# Patient Record
Sex: FEMALE | Race: WHITE | Employment: OTHER | URBAN - METROPOLITAN AREA
[De-identification: names, ages, dates, MRNs, and addresses within clinical notes are randomized per-mention and may not be internally consistent; named-entity substitution may affect disease eponyms.]

---

## 2018-06-26 ENCOUNTER — HOSPITAL ENCOUNTER (OUTPATIENT)
Dept: GENERAL RADIOLOGY | Age: 83
Discharge: HOME | End: 2018-06-26
Payer: MEDICARE

## 2018-06-26 DIAGNOSIS — R91.8 LUNG MASS: ICD-10-CM

## 2018-06-26 DIAGNOSIS — S22.41XA MULTIPLE FRACTURES OF RIBS OF RIGHT SIDE: ICD-10-CM

## 2018-06-26 PROCEDURE — 71100 X-RAY EXAM RIBS UNI 2 VIEWS: CPT

## 2018-06-26 PROCEDURE — 71046 X-RAY EXAM CHEST 2 VIEWS: CPT

## 2018-08-30 ENCOUNTER — OFFICE VISIT (OUTPATIENT)
Dept: UROLOGY | Age: 83
End: 2018-08-30

## 2018-08-30 VITALS
SYSTOLIC BLOOD PRESSURE: 151 MMHG | DIASTOLIC BLOOD PRESSURE: 75 MMHG | BODY MASS INDEX: 16.16 KG/M2 | OXYGEN SATURATION: 92 % | HEIGHT: 65 IN | HEART RATE: 98 BPM | WEIGHT: 97 LBS

## 2018-08-30 DIAGNOSIS — R35.0 URINARY FREQUENCY: Primary | ICD-10-CM

## 2018-08-30 DIAGNOSIS — N34.2 ATROPHIC URETHRITIS: ICD-10-CM

## 2018-08-30 DIAGNOSIS — N95.2 ATROPHIC VAGINITIS: ICD-10-CM

## 2018-08-30 LAB
BILIRUB UR QL STRIP: NEGATIVE
GLUCOSE UR-MCNC: NEGATIVE MG/DL
KETONES P FAST UR STRIP-MCNC: NEGATIVE MG/DL
PH UR STRIP: 5.5 [PH] (ref 4.6–8)
PROT UR QL STRIP: NEGATIVE
SP GR UR STRIP: 1.01 (ref 1–1.03)
UA UROBILINOGEN AMB POC: NORMAL (ref 0.2–1)
URINALYSIS CLARITY POC: CLEAR
URINALYSIS COLOR POC: YELLOW
URINE BLOOD POC: NEGATIVE
URINE LEUKOCYTES POC: NEGATIVE
URINE NITRITES POC: NEGATIVE

## 2018-08-30 RX ORDER — FLUOXETINE 10 MG/1
CAPSULE ORAL DAILY
COMMUNITY

## 2018-08-30 RX ORDER — CIPROFLOXACIN 500 MG/1
TABLET ORAL 2 TIMES DAILY
COMMUNITY

## 2018-08-30 NOTE — MR AVS SNAPSHOT
[address] [place] [address] 
[phone] Patient: Trudy Page MRN: [id number] VOP:9/31/5873 Visit Information Date & Time Provider Department Dept. Phone Encounter #  
 8/30/2018  1:30 PM Tony Elizabeth, [address] [place] [address] [facility] [phone] [id number] Upcoming Health Maintenance Date Due DTaP/Tdap/Td series (1 - Tdap) 9/19/1949 ZOSTER VACCINE AGE 60> 7/19/1988 GLAUCOMA SCREENING Q2Y 9/19/1993 Bone Densitometry (Dexa) Screening 9/19/1993 Pneumococcal 65+ Low/Medium Risk (1 of 2 - PCV13) 9/19/1993 MEDICARE YEARLY EXAM 3/20/2018 Influenza Age 5 to Adult 8/1/2018 Allergies as of 8/30/2018  Review Complete On: 8/30/2018 By: Art Rodriguez LPN No Known Allergies Current Immunizations  Never Reviewed No immunizations on file. Not reviewed this visit You Were Diagnosed With   
  
 Codes Comments Urinary frequency    -  Primary ICD-10-CM: R35.0 ICD-9-CM: 788.41 Vitals BP Pulse Height(growth percentile) Weight(growth percentile) SpO2 BMI  
 151/75 (BP 1 Location: Left arm, BP Patient Position: Sitting) 98 5' 5\" (1.651 m) 97 lb (44 kg) 92% 16.14 kg/m2 OB Status Smoking Status Postmenopausal Never Smoker Vitals History BMI and BSA Data Body Mass Index Body Surface Area  
 16.14 kg/m 2 1.42 m 2 Preferred Pharmacy Pharmacy Name [facility] - [place] De[place], [address] [place] [address],[phone] [place] [address] [phone] Your Updated Medication List  
  
   
This list is accurate as of 8/30/18  3:04 PM.  Always use your most recent med list.  
  
  
  
  
 acetaminophen 325 mg tablet Commonly known as:  TYLENOL Take 2 Tabs by mouth every four (4) hours as needed (for fever or pain level less than 5/10). ascorbic acid (vitamin C) 250 mg tablet Commonly known as:  VITAMIN C  
 Take 1 Tab by mouth two (2) times daily (with meals). betaxolol 0.25 % ophthalmic suspension Commonly known as:  BETOPTIC-S Administer 1 Drop to both eyes two (2) times a day. calcium carbonate 200 mg calcium (500 mg) Chew Commonly known as:  TUMS Take 1 Tab by mouth three (3) times daily (with meals). cholecalciferol 5,000 unit capsule Commonly known as:  VITAMIN D3 Take 1 Cap by mouth daily. CIPRO 500 mg tablet Generic drug:  ciprofloxacin HCl Take  by mouth two (2) times a day. cyanocobalamin (vitamin B-12) 2,000 mcg Tab Take 2,000 mcg by mouth daily. ferrous sulfate 325 mg (65 mg iron) tablet Take 1 Tab by mouth two (2) times daily (with meals). metoprolol tartrate 50 mg tablet Commonly known as:  LOPRESSOR Take 1 Tab by mouth every twelve (12) hours. PROzac 10 mg capsule Generic drug:  FLUoxetine Take  by mouth daily. traMADol 50 mg tablet Commonly known as:  ULTRAM  
Take 1 Tab by mouth every six (6) hours as needed (for pain level greater than 4/10). Max Daily Amount: 200 mg.  
  
 travoprost 0.004 % ophthalmic solution Commonly known as:  TRAVATAN Z Administer 1 Drop to right eye nightly. We Performed the Following AMB POC URINALYSIS DIP STICK AUTO W/O MICRO [18185 CPT(R)] Patient Instructions Frequent Urination: Care Instructions Your Care Instructions An urge to urinate frequently but usually passing only small amounts of urine is a common symptom of urinary problems, such as urinary tract infections. The bladder may become inflamed. This can cause the urge to urinate. You may try to urinate more often than usual to try to soothe that urge. Frequent urination also may be caused by sexually transmitted infections (STIs) or kidney stones.  Or it may happen when something irritates the tube that carries urine from the bladder to the outside of the body (urethra). It may also be a sign of diabetes. The cause may be hard to find. You may need tests. Follow-up care is a key part of your treatment and safety. Be sure to make and go to all appointments, and call your doctor if you are having problems. It's also a good idea to know your test results and keep a list of the medicines you take. How can you care for yourself at home? · Drink extra water for the next day or two. This will help make the urine less concentrated. (If you have kidney, heart, or liver disease and have to limit fluids, talk with your doctor before you increase the amount of fluids you drink.) · Avoid drinks that are carbonated or have caffeine. They can irritate the bladder. For women: · Urinate right after you have sex. · After you go to the bathroom, wipe from front to back. · Avoid douches, bubble baths, and feminine hygiene sprays. And avoid other feminine hygiene products that have deodorants. When should you call for help? Call your doctor now or seek immediate medical care if: 
  · You have new symptoms, such as fever, nausea, or vomiting.  
  · You have new or worse symptoms of a urinary problem. For example: ¨ You have blood or pus in your urine. ¨ You have chills or body aches. ¨ It hurts to urinate. ¨ You have groin or belly pain. ¨ You have pain in your back just below your rib cage (the flank area).  
 Watch closely for changes in your health, and be sure to contact your doctor if you feel thirstier than usual. 
Where can you learn more? Go to http://jessie-royal.info/. Enter 231 4357 in the search box to learn more about \"Frequent Urination: Care Instructions. \" Current as of: May 12, 2017 Content Version: 11.7 © 9042-2375 Healthwise, Incorporated. Care instructions adapted under license by [employer] (which disclaims liability or warranty for this information).  If you have questions about a medical condition or this instruction, always ask your healthcare professional. [address] any warranty or liability for your use of this information. Introducing \[facility] [place] [facility]\"" & HEALTH SERVICES! Kiera Guerrero introduces [employer] patient portal. Now you can access parts of your medical record, email your doctor's office, and request medication refills online. 1. In your internet browser, go to [email or web address] 2. Click on the First Time User? Click Here link in the Sign In box. You will see the New Member Sign Up page. 3. Enter your [employer] Access Code exactly as it appears below. You will not need to use this code after youve completed the sign-up process. If you do not sign up before the expiration date, you must request a new code. · [employer] Access Code: [email or web address] Expires: 9/24/2018  9:49 AM 
 
4. Enter the last four digits of your Social Security Number (xxxx) and Date of Birth (mm/dd/yyyy) as indicated and click Submit. You will be taken to the next sign-up page. 5. Create a [employer] ID. This will be your [email or web address] login ID and cannot be changed, so think of one that is secure and easy to remember. 6. Create a [email or web address] password. You can change your password at any time. 7. Enter your Password Reset Question and Answer. This can be used at a later time if you forget your password. 8. Enter your e-mail address. You will receive e-mail notification when new information is available in [address] 9. Click Sign Up. You can now view and download portions of your medical record. 10. Click the Download Summary menu link to download a portable copy of your medical information. If you have questions, please visit the Frequently Asked Questions section of the [email or web address] website. Remember, [employer] is NOT to be used for urgent needs. For medical emergencies, dial 911. Now available from your iPhone and Android! Please provide this summary of care documentation to your next provider. Your primary care clinician is listed as FELICITA ALONSO. If you have any questions after today's visit, please call [phone].

## 2018-08-30 NOTE — PROGRESS NOTES
Chief Complaint Patient presents with  New Patient  Urinary Frequency HISTORY OF PRESENT ILLNESS:  Kusum Age is a 80 y.o. female presents with significant nocturia. According to her, she may urinate almost hourly at night but only go a couple of times during the day. Her  who is with her states that sometimes she will go perhaps 3 or 4 times daily but does go in fact frequently at night. This is started probably around the first of the year so it has not been going on for a long long time. According to her, she may have a little bit of blood on the tissue from time to time but she also says she may have some bleeding with stool to the point that the blood will actually run down her leg. I think none of the bleeding other than possibly the blood on the tissue has to do with her urinary system. She is on virtually no medication at this time according to her but this is in disagreement with the contents on the electronic medical record. She has no urinary incontinence and has not had any urinary infections that she is aware of. She has 4 children, all delivered normally and has never had any gynecologic surgery. She had a gynecologic exam about 3 months ago and her doctor there put her on some estrogen cream. 
 
Past Medical History:  
Diagnosis Date  Decreased calculated GFR 2/1/2016 Calculated GFR equivalent to that of CKD Stage 3 (30-59 ml/min)  Disrupted sleep-wake cycle 2/3/2016  Glaucoma  History of blood transfusion 2/1/2016  
 2 units pRBC  History of fracture of right hip 1/29/2016 Closed displaced comminuted intertrochanteric fracture of the right femur  Irregular heart beat  Postoperative anemia due to acute blood loss 1/31/2016  Vitamin B12 deficiency (non anemic) 1/31/2016 Vitamin B12 (1/31/2016) = 166  Vitamin D deficiency 2/1/2016 Vitamin D 25-Hydroxy (2/01/2016) = 17.6; Vitamin D 25-Hydroxy (2/02/2016) = 14.5 Past Surgical History:  
Procedure Laterality Date  HX CATARACT REMOVAL Bilateral   
 HX HIP FRACTURE TX Right 1/30/2016 S/P Open reduction, internal fixation of right 3-part intertrochanteric hip fracture with intramedullary implant (1/30/2016 - Dr. Nichelle Echols.)  HX MALIGNANT SKIN LESION EXCISION Melanoma of right hip  HX TUBAL LIGATION Social History Substance Use Topics  Smoking status: Never Smoker  Smokeless tobacco: Never Used  Alcohol use Yes Comment: Wine with dinner No Known Allergies Family History Problem Relation Age of Onset [facility] Other Mother Amyotrophic Lateral Sclerosis  Prostate Cancer Father  Heart Disease Father Current Outpatient Prescriptions Medication Sig Dispense Refill  ciprofloxacin HCl (CIPRO) 500 mg tablet Take  by mouth two (2) times a day.  FLUoxetine (PROZAC) 10 mg capsule Take  by mouth daily.  betaxolol (BETOPTIC-S) 0.25 % ophthalmic suspension Administer 1 Drop to both eyes two (2) times a day.  acetaminophen (TYLENOL) 325 mg tablet Take 2 Tabs by mouth every four (4) hours as needed (for fever or pain level less than 5/10). 30 Tab 0  
 traMADol (ULTRAM) 50 mg tablet Take 1 Tab by mouth every six (6) hours as needed (for pain level greater than 4/10). Max Daily Amount: 200 mg. 15 Tab 0  
 calcium carbonate (TUMS) 200 mg calcium (500 mg) chew Take 1 Tab by mouth three (3) times daily (with meals). 45 Tab 0  
 metoprolol (LOPRESSOR) 50 mg tablet Take 1 Tab by mouth every twelve (12) hours. 30 Tab 0  
 ferrous sulfate 325 mg (65 mg iron) tablet Take 1 Tab by mouth two (2) times daily (with meals). 30 Tab 0  
 cyanocobalamin 2,000 mcg tab Take 2,000 mcg by mouth daily. 15 Tab 0  
 ascorbic acid (VITAMIN C) 250 mg tablet Take 1 Tab by mouth two (2) times daily (with meals). 30 Tab 0  cholecalciferol (VITAMIN D3) 5,000 unit capsule Take 1 Cap by mouth daily.  15 Cap 0  
  travoprost (TRAVATAN Z) 0.004 % ophthalmic solution Administer 1 Drop to right eye nightly. 5 mL 1 REVIEW OF SYSTEMS:  
Documented on the chart PHYSICAL EXAMINATION:  
 
Visit Vitals  /75 (BP 1 Location: Left arm, BP Patient Position: Sitting)  Pulse 98  
 Ht 5' 5\" (1.651 m)  Wt 97 lb (44 kg)  SpO2 92%  BMI 16.14 kg/m2 Constitutional: Well developed, but extremely thin elderly lady who is very pleasant. CV:  No peripheral swelling noted Respiratory: No respiratory distress or difficulties Abdomen:  Soft and nontender. No masses. No hepatosplenomegaly.  Female: A pelvic exam reveals normal BSU and external genitalia with the expected findings of severe atrophic vaginitis and urethritis. A digital exam reveals no urethral masses or bladder masses that I can detect and virtually no significant urethral caruncle. Skin:  Normal color. No evidence of jaundice. Neuro/Psych:  Patient with appropriate affect. Alert and oriented. Lymphatic:   No enlargement of supraclavicular lymph nodes. Results for orders placed or performed during the hospital encounter of 02/02/16 CBC WITH AUTOMATED DIFF Result Value Ref Range WBC 11.8 4.6 - 13.2 K/uL  
 RBC 2.96 (L) 4.20 - 5.30 M/uL HGB 9.3 (L) 12.0 - 16.0 g/dL HCT 28.0 (L) 35.0 - 45.0 % MCV 94.6 74.0 - 97.0 FL  
 MCH 31.4 24.0 - 34.0 PG  
 MCHC 33.2 31.0 - 37.0 g/dL  
 RDW 13.5 11.6 - 14.5 % PLATELET 957 072 - 054 K/uL MPV 9.0 (L) 9.2 - 11.8 FL  
 NEUTROPHILS 70 40 - 73 % LYMPHOCYTES 15 (L) 21 - 52 % MONOCYTES 14 (H) 3 - 10 % EOSINOPHILS 1 0 - 5 % BASOPHILS 0 0 - 2 %  
 ABS. NEUTROPHILS 8.3 (H) 1.8 - 8.0 K/UL  
 ABS. LYMPHOCYTES 1.7 0.9 - 3.6 K/UL  
 ABS. MONOCYTES 1.6 (H) 0.05 - 1.2 K/UL  
 ABS. EOSINOPHILS 0.1 0.0 - 0.4 K/UL  
 ABS. BASOPHILS 0.0 0.0 - 0.06 K/UL  
 DF AUTOMATED METABOLIC PANEL, BASIC Result Value Ref Range  Sodium 137 136 - 145 mmol/L  
 Potassium 3.4 (L) 3.5 - 5.5 mmol/L Chloride 100 100 - 108 mmol/L  
 CO2 26 21 - 32 mmol/L Anion gap 11 3.0 - 18 mmol/L Glucose 116 (H) 74 - 99 mg/dL BUN 17 7.0 - 18 MG/DL Creatinine 0.75 0.6 - 1.3 MG/DL  
 BUN/Creatinine ratio 23 (H) 12 - 20 GFR est AA >60 >60 ml/min/1.73m2 GFR est non-AA >60 >60 ml/min/1.73m2 Calcium 8.4 (L) 8.5 - 10.1 MG/DL  
VITAMIN D, 25 HYDROXY Result Value Ref Range Vitamin D 25-Hydroxy 14.5 (L) 30 - 100 ng/mL MAGNESIUM Result Value Ref Range Magnesium 1.8 1.8 - 2.4 mg/dL HGB & HCT Result Value Ref Range HGB 9.2 (L) 12.0 - 16.0 g/dL HCT 26.9 (L) 35.0 - 45.0 % PLATELET Result Value Ref Range PLATELET 882 336 - 344 K/uL METABOLIC PANEL, BASIC Result Value Ref Range Sodium 144 136 - 145 mmol/L Potassium 3.4 (L) 3.5 - 5.5 mmol/L Chloride 102 100 - 108 mmol/L  
 CO2 32 21 - 32 mmol/L Anion gap 10 3.0 - 18 mmol/L Glucose 97 74 - 99 mg/dL BUN 22 (H) 7.0 - 18 MG/DL Creatinine 0.57 (L) 0.6 - 1.3 MG/DL  
 BUN/Creatinine ratio 39 (H) 12 - 20 GFR est AA >60 >60 ml/min/1.73m2 GFR est non-AA >60 >60 ml/min/1.73m2 Calcium 8.6 8.5 - 10.1 MG/DL  
HGB & HCT Result Value Ref Range HGB 9.3 (L) 12.0 - 16.0 g/dL HCT 27.9 (L) 35.0 - 45.0 % PLATELET Result Value Ref Range PLATELET 371 827 - 189 K/uL HGB & HCT Result Value Ref Range HGB 9.9 (L) 12.0 - 16.0 g/dL HCT 30.0 (L) 35.0 - 45.0 % PLATELET Result Value Ref Range PLATELET 669 (H) 070 - 420 K/uL GLUCOSE, POC Result Value Ref Range Glucose (POC) 100 70 - 110 mg/dL A post void residual by ultrasound revealed only 27 cc of urine. REVIEW OF LABS AND IMAGING:   
 
Imaging Report Reviewed? Yes    
Images Reviewed? Yes Other Lab Data Reviewed? YES 
 
ASSESSMENT:  
  ICD-10-CM ICD-9-CM 1. Urinary frequency R35.0 788.41 AMB POC URINALYSIS DIP STICK AUTO W/O MICRO 2.  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   
 3. Atrophic vaginitis N95.2 627.3 PLAN/DISCUSSION: Based on the physical findings and her history, I really have no good explanation for her relatively sudden change in to frequent nocturia. In talking with her further, she has very obvious orthostatic hypotensive changes when she stands up so I suspect she has a good bit more coronary disease and decreased cardiac function that she is aware of. I think this is probably simply a fact that she now can have better cardiac function when she is lying horizontal and consequently makes more urine at night than when she is up and about during the day. She also does not drink very much intentionally because she feels that makes her get up at night and urinate. Her blood pressure sitting was 158/98 and her pulse was 90 but I think when she stands up clearly she gets woozy and gets a drop in blood pressure. At this point in time I am going to leave her on the vaginal cream that her gynecologist prescribed. She was unable to get us a urine sample here in the office so her  was given a container to get and bring back tomorrow morning. Patient voices understanding and agreement to the plan. Gustavo Chavez MD on 8/30/2018 Please note: This document has been produced using voice recognition software. Unrecognized errors in transcription may be present.

## 2018-08-30 NOTE — PATIENT INSTRUCTIONS
Frequent Urination: Care Instructions Your Care Instructions An urge to urinate frequently but usually passing only small amounts of urine is a common symptom of urinary problems, such as urinary tract infections. The bladder may become inflamed. This can cause the urge to urinate. You may try to urinate more often than usual to try to soothe that urge. Frequent urination also may be caused by sexually transmitted infections (STIs) or kidney stones. Or it may happen when something irritates the tube that carries urine from the bladder to the outside of the body (urethra). It may also be a sign of diabetes. The cause may be hard to find. You may need tests. Follow-up care is a key part of your treatment and safety. Be sure to make and go to all appointments, and call your doctor if you are having problems. It's also a good idea to know your test results and keep a list of the medicines you take. How can you care for yourself at home? · Drink extra water for the next day or two. This will help make the urine less concentrated. (If you have kidney, heart, or liver disease and have to limit fluids, talk with your doctor before you increase the amount of fluids you drink.) · Avoid drinks that are carbonated or have caffeine. They can irritate the bladder. For women: · Urinate right after you have sex. · After you go to the bathroom, wipe from front to back. · Avoid douches, bubble baths, and feminine hygiene sprays. And avoid other feminine hygiene products that have deodorants. When should you call for help? Call your doctor now or seek immediate medical care if: 
  · You have new symptoms, such as fever, nausea, or vomiting.  
  · You have new or worse symptoms of a urinary problem. For example: ¨ You have blood or pus in your urine. ¨ You have chills or body aches. ¨ It hurts to urinate. ¨ You have groin or belly pain. ¨ You have pain in your back just below your rib cage (the flank area).  Watch closely for changes in your health, and be sure to contact your doctor if you feel thirstier than usual. 
Where can you learn more? Go to http://jessie-royal.info/. Enter 323 2298 in the search box to learn more about \"Frequent Urination: Care Instructions. \" Current as of: May 12, 2017 Content Version: 11.7 © 6729-8706 [employer]. Care instructions adapted under license by [employer] (which disclaims liability or warranty for this information). If you have questions about a medical condition or this instruction, always ask your healthcare professional. [place] [address] any warranty or liability for your use of this information.

## 2018-08-30 NOTE — PROGRESS NOTES
Ms. Jessica Jeff has a reminder for a \"due or due soon\" health maintenance. I have asked that she contact her primary care provider for follow-up on this health maintenance.

## 2018-08-31 ENCOUNTER — HOSPITAL ENCOUNTER (OUTPATIENT)
Dept: LAB | Age: 83
Discharge: HOME | End: 2018-08-31
Payer: MEDICARE

## 2018-08-31 DIAGNOSIS — R35.0 URINARY FREQUENCY: Primary | ICD-10-CM

## 2018-08-31 PROCEDURE — 88112 CYTOPATH CELL ENHANCE TECH: CPT | Performed by: UROLOGY

## 2018-10-17 ENCOUNTER — HOSPITAL ENCOUNTER (OUTPATIENT)
Dept: PHYSICAL THERAPY | Age: 83
Discharge: HOME | End: 2018-10-17
Payer: MEDICARE

## 2018-10-17 PROCEDURE — G8978 MOBILITY CURRENT STATUS: HCPCS

## 2018-10-17 PROCEDURE — G8979 MOBILITY GOAL STATUS: HCPCS

## 2018-10-17 PROCEDURE — 97162 PT EVAL MOD COMPLEX 30 MIN: CPT

## 2018-10-17 PROCEDURE — 97110 THERAPEUTIC EXERCISES: CPT

## 2018-10-17 NOTE — PROGRESS NOTES
PT DAILY TREATMENT NOTE 10-18 Patient Name: Ayanna Aly Date:10/17/2018 : 1928 [x]  Patient  Verified [place]or: [place] / Plan: Marco A Vanegasy / Product Type: Medicare / In time:11:40  Out time:12:15 Total Treatment Time (min): 35 Visit #: 1 of 10 Medicare/BCBS Only Total Timed Codes (min):  20 1:1 Treatment Time:  35 Treatment Area: Bilateral hip pain [M25.551, M25.552] Low back pain [M54.5] SUBJECTIVE Pain Level (0-10 scale): 3/10 Any medication changes, allergies to medications, adverse drug reactions, diagnosis change, or new procedure performed?: [x] No    [] Yes (see summary sheet for update) Subjective functional status/changes:   [x] See paper initial evaluation form in patient chart. OBJECTIVE 15 min [x]Eval                  []Re-Eval  
 
20 min Therapeutic Exercise:  [] See flow sheet : HEP given and reviewed with Pt Rationale: increase ROM and increase strength to improve the patients ability to improve posture, increase strength for household chores With 
 [x] TE 
 [] TA 
 [] neuro 
 [] other: Patient Education: [x] Review HEP [] Progressed/Changed HEP based on:  
[] positioning   [] body mechanics   [] transfers   [] heat/ice application   
[] other:   
 
Other Objective/Functional Measures: FOTO 39 pts Pain Level (0-10 scale) post treatment: 3/10 ASSESSMENT/Changes in Function:  
 
Patient will continue to benefit from skilled PT services to address functional mobility deficits, address ROM deficits, address strength deficits, analyze and address soft tissue restrictions, analyze and cue movement patterns, analyze and modify body mechanics/ergonomics, assess and modify postural abnormalities, address imbalance/dizziness and instruct in home and community integration to attain remaining goals. [x]  See Plan of Care 
[]  See progress note/recertification 
[]  See Discharge Summary PLAN 
 []  Upgrade activities as tolerated     [x]  Continue plan of care 
[]  Update interventions per flow sheet      
[]  Discharge due to:_ 
[]  Other:_   
 
Franco Rivero, PT 10/17/2018  1:44 PM

## 2018-10-17 NOTE — PROGRESS NOTES
[facility] 
[address] 
[phone] fax Plan of Care/ Statement of Necessity for Physical Therapy Services Patient name: Nilesh Sutton Start of Care: 10/17/2018 Referral source: Kaila Patel MD : 1928 Medical Diagnosis: Bilateral hip pain [M25.551, M25.552] Low back pain [M54.5] Onset Date:10/8/18 Treatment Diagnosis: Low Back, Bilateral Hip Pain Prior Hospitalization: see medical history Provider#: [id number] Medications: Verified on Patient summary List  
 Comorbidities: Glaucoma left eye; hearing impaired; hx ORIF right femur  Prior Level of Function: Lives in [place] home with spouse; functionally independent; amb without A.D. The Plan of Care and following information is based on the information from the initial evaluation. Assessment/ key information: Pt is a 80 y.o. female who presents with c/o central lower back and B hip pain that has persisted for several months. Pt has hx of right femur fx and subsequent surgery to repair in  and recovered fully but in recent months, reports decline in B LE strength, decreased standing balance, pain with prolonged standing/amb, affecting ability to perform household chores without multiple breaks, and decreased activity tolerance in community with spouse. Upon exam, Pt exhibited palpable tightness in B L/S paraspinals; spinal curvature in L/S, reproducing pain and limitations in L/S AROM to 25% of full all planes; 3+ to 4-/5 B hip/knee strength grossly; decreased B quad/HS inflexibility, reproducing LBP; and standing imbalance during static and dynamic tests, requiring CGA to prevent LOB. Pt would benefit from skilled PT to address above deficits to improve Pt's function and ability to return to more active lifestyle without pain or fatigue.  
 
Evaluation Complexity History MEDIUM  Complexity : 1-2 comorbidities / personal factors will impact the outcome/ POC ; Examination MEDIUM Complexity : 3 Standardized tests and measures addressing body structure, function, activity limitation and / or participation in recreation  ;Presentation MEDIUM Complexity : Evolving with changing characteristics  ; Clinical Decision Making MEDIUM Complexity : FOTO score of 26-74 Overall Complexity Rating: MEDIUM Problem List: pain affecting function, decrease ROM, decrease strength, impaired gait/ balance, decrease ADL/ functional abilitiies, decrease activity tolerance, decrease flexibility/ joint mobility and decrease transfer abilities Treatment Plan may include any combination of the following: Therapeutic exercise, Therapeutic activities, Neuromuscular re-education, Physical agent/modality, Gait/balance training, Manual therapy, Aquatic therapy, Patient education, Functional mobility training and Stair training Patient / Family readiness to learn indicated by: asking questions and interest 
Persons(s) to be included in education: patient (P) and family support person (FSP);list spouse Barriers to Learning/Limitations: None Patient Goal (s): To get stronger and reduce the pain.  Patient Self Reported Health Status: good Rehabilitation Potential: good Short Term Goals: To be accomplished in 1 weeks: 
Goal: Pt to be compliant with initial HEP to improve lumbar mobility and LE strength. Status at last note/certification: Established and reviewed with Pt Long Term Goals: To be accomplished in 5 weeks: 
Goal: Pt to exhibit 50% of full L/S AROM all planes for ease with performing ADLs at home. Status at last note/certification: 53% of full all planes with pain Goal: Pt to increase B hip/knee strength to 4/5 or > grossly to increase standing/amb tolerance for performing household chores. Status at last note/certification: 3+ to 4-/5 grossly Goal: Pt to report < 5/10 pain at worst in lower back and hips to be able to help spouse with management of home. Status at last note/certification: 5/61 pain at worst 
Goal: Pt to report FOTO score of 51 pts to show improved function, quality of life. Status at last note/certification: FOTO 39 pts Frequency / Duration: Patient to be seen 2 times per week for 5 weeks. Patient/ Caregiver education and instruction: Diagnosis, prognosis, exercises 
 [x]  Plan of care has been reviewed with PTA 
 
G-Codes (GP) Mobility  Current  CL= 60-79%   Goal  CK= 40-59% The severity rating is based on clinical judgment and the FOTO score. Certification Period: 10/17/18 - 11/15/18 Meryle Metz Daughtry, PT 10/17/2018 1:48 PM 
_____________________________________________________________________ I certify that the above Therapy Services are being furnished while the patient is under my care. I agree with the treatment plan and certify that this therapy is necessary. [de-identified] Signature:____________Date:_________TIME:________ 
 
[employer], Date and Time must be completed for valid certification ** Please sign and return to [facility] [address] 
[phone] fax

## 2018-10-25 ENCOUNTER — HOSPITAL ENCOUNTER (OUTPATIENT)
Dept: PHYSICAL THERAPY | Age: 83
Discharge: HOME | End: 2018-10-25
Payer: MEDICARE

## 2018-10-25 PROCEDURE — 97112 NEUROMUSCULAR REEDUCATION: CPT

## 2018-10-25 PROCEDURE — 97110 THERAPEUTIC EXERCISES: CPT

## 2018-10-25 NOTE — PROGRESS NOTES
PT DAILY TREATMENT NOTE 10-18 Patient Name: Nilesh Sutton Date:10/25/2018 : 1928 [x]  Patient  Verified Payor: [place] / Plan: [place] / Product Type: Medicare / In time:10;10  Out time:11;00 Total Treatment Time (min): 50 Visit #: 2 of 10 Medicare/BCBS Only Total Timed Codes (min):  50 1:1 Treatment Time:  50 Treatment Area: Bilateral hip pain [M25.551, M25.552] Low back pain [M54.5] SUBJECTIVE Pain Level (0-10 scale): 0 Any medication changes, allergies to medications, adverse drug reactions, diagnosis change, or new procedure performed?: [x] No    [] Yes (see summary sheet for update) Subjective functional status/changes:   [] No changes reported I hop you can help with my back  I used to walk 2 miles a day, but it's been along time. OBJECTIVE 10 min Therapeutic Exercise:  [] See flow sheet :  
Rationale: increase ROM and increase strength to improve the patients ability to improve postural strength, awareness 40 min Neuromuscular Re-education:  []  See flow sheet :  
Rationale: increase strength, improve coordination, improve balance and increase proprioception  to improve the patients ability to increase stability, improve posture and activity tolerance With 
 [] TE 
 [] TA 
 [] neuro 
 [] other: Patient Education: [x] Review HEP [] Progressed/Changed HEP based on:  
[] positioning   [] body mechanics   [] transfers   [] heat/ice application   
[] other:   
 
Other Objective/Functional Measures:  
Initiated ex program 
 
Pain Level (0-10 scale) post treatment:  0 
 
ASSESSMENT/Changes in Function: first follow-up after eval.  No c/o pain during ex's. Demo good static balance with EO, mild trunk sway with EC, but no LOB or balance checks Patient will continue to benefit from skilled PT services to modify and progress therapeutic interventions, address functional mobility deficits, address ROM deficits, address strength deficits, analyze and address soft tissue restrictions, analyze and cue movement patterns, analyze and modify body mechanics/ergonomics, assess and modify postural abnormalities, address imbalance/dizziness and instruct in home and community integration to attain remaining goals. []  See Plan of Care 
[]  See progress note/recertification 
[]  See Discharge Summary Progress towards goals / Updated goals: 
Goal: Pt to be compliant with initial HEP to improve lumbar mobility and LE strength. Status at last note/certification: Established and reviewed with Pt Long Term Goals: To be accomplished in 5 weeks: 
Goal: Pt to exhibit 50% of full L/S AROM all planes for ease with performing ADLs at home. Status at last note/certification: 08% of full all planes with pain Goal: Pt to increase B hip/knee strength to 4/5 or > grossly to increase standing/amb tolerance for performing household chores. Status at last note/certification: 3+ to 4-/5 grossly Goal: Pt to report < 5/10 pain at worst in lower back and hips to be able to help spouse with management of home. Status at last note/certification: 3/97 pain at worst 
Goal: Pt to report FOTO score of 51 pts to show improved function, quality of life. Status at last note/certification: FOTO 39 pts 
  
 
PLAN [x]  Upgrade activities as tolerated     []  Continue plan of care 
[]  Update interventions per flow sheet      
[]  Discharge due to:_ 
[]  Other:_ Merly Rodriguez, YONATHAN 10/25/2018  10:15 AM 
 
Future Appointments Date Time Provider Butch Cazares 10/30/2018 11:00 AM [place], [address]

## 2018-10-30 ENCOUNTER — HOSPITAL ENCOUNTER (OUTPATIENT)
Dept: PHYSICAL THERAPY | Age: 83
Discharge: HOME | End: 2018-10-30
Payer: MEDICARE

## 2018-10-30 PROCEDURE — 97110 THERAPEUTIC EXERCISES: CPT

## 2018-10-30 PROCEDURE — 97112 NEUROMUSCULAR REEDUCATION: CPT

## 2018-10-30 NOTE — PROGRESS NOTES
PT DAILY TREATMENT NOTE 10-18 Patient Name: Corinna Moctezuma Date:10/30/2018 : 1928 [x]  Patient  Verified [place]: [place] / [place] / Product Type: Medicare / In time:11;00  Out time:;45 Total Treatment Time (min): 45 Visit #: 3 of 10 Medicare/BCBS Only Total Timed Codes (min):  45 1:1 Treatment Time:  35 Treatment Area: Bilateral hip pain [M25.551, M25.552] SUBJECTIVE Pain Level (0-10 scale): 0 Any medication changes, allergies to medications, adverse drug reactions, diagnosis change, or new procedure performed?: [x] No    [] Yes (see summary sheet for update) Subjective functional status/changes:   [] No changes reported I didn't get tired after last session. I was surprised. OBJECTIVE 15 min Therapeutic Exercise:  [] See flow sheet :  
Rationale: increase strength, improve coordination and improve balance to improve the patients ability to more easily perform daily tasks 30 min Neuromuscular Re-education:  []  See flow sheet :  
Rationale: increase strength, improve coordination and improve balance  to improve the patients ability to improve posture, activity tolerance With 
 [] TE 
 [] TA 
 [] neuro 
 [] other: Patient Education: [x] Review HEP [] Progressed/Changed HEP based on:  
[] positioning   [] body mechanics   [] transfers   [] heat/ice application   
[] other:   
 
Other Objective/Functional Measures:  
[facility]. Cues to recall ex's and improve upright posture Pain Level (0-10 scale) post treatment:  0 
 
ASSESSMENT/Changes in Function: Effecting pt's ability to perform daily tasks Patient will continue to benefit from skilled PT services to modify and progress therapeutic interventions, address functional mobility deficits, address ROM deficits, address strength deficits, analyze and address soft tissue restrictions, analyze and cue movement patterns, analyze and modify body mechanics/ergonomics, assess and modify postural abnormalities, address imbalance/dizziness and instruct in home and community integration to attain remaining goals. []  See Plan of Care 
[]  See progress note/recertification 
[]  See Discharge Summary Progress towards goals / Updated goals: 
Goal: Pt to be compliant with initial HEP to improve lumbar mobility and LE strength. Status at last note/certification: Established and reviewed with Pt 
met Long Term Goals: To be accomplished in 5 weeks: 
Goal: Pt to exhibit 50% of full L/S AROM all planes for ease with performing ADLs at home. Status at last note/certification: 35% of full all planes with pain Goal: Pt to increase B hip/knee strength to 4/5 or > grossly to increase standing/amb tolerance for performing household chores. Status at last note/certification: 3+ to 4-/5 grossly Goal: Pt to report < 5/10 pain at worst in lower back and hips to be able to help spouse with management of home. Status at last note/certification: 8/46 pain at worst 
Not met: 9/10 Goal: Pt to report FOTO score of 51 pts to show improved function, quality of life. Status at last note/certification: FOTO 39 pts 
  
 
PLAN [x]  Upgrade activities as tolerated     []  Continue plan of care 
[]  Update interventions per flow sheet      
[]  Discharge due to:_ 
[]  Other:_ Linus Moyer PTA 10/30/2018  11:24 AM 
 
No future appointments.

## 2018-11-14 NOTE — PROGRESS NOTES
[facility] 
[address] Al[place] Caballero, [address] [place] [address] 
[phone] fax Discharge Summary Patient name: Olesya Clayton Start of Care: 10/17/2018 Referral source: Eveline Tee MD : 1928 Medical Diagnosis: Bilateral hip pain [M25.551, M25.552] Low back pain [M54.5] 
  Onset Date:10/8/18 Treatment Diagnosis: Low Back, Bilateral Hip Pain Prior Hospitalization: see medical history Provider#: [id number] Medications: Verified on Patient summary List  
 Comorbidities: Glaucoma left eye; hearing impaired; hx ORIF right femur  Prior Level of Function: Lives in [place] home with spouse; functionally independent; amb without A.D. Visits from Start of Care: 3    Missed Visits: 0 Reporting Period : 10/17/18 to 18 Short Term Goals: To be accomplished in 1 week: 
Goal: Pt to be compliant with initial HEP to improve lumbar mobility and LE strength. Status at last note/certification: Established and reviewed with Pt Current status: met - Pt reports compliance with HEP Long Term Goals: To be accomplished in 5 weeks: 
Goal: Pt to exhibit 50% of full L/S AROM all planes for ease with performing ADLs at home. Status at last note/certification: 25% of full all planes with pain Current status: not met - unable to reassess Goal: Pt to increase B hip/knee strength to 4/5 or > grossly to increase standing/amb tolerance for performing household chores. Status at last note/certification: 3+ to 4-/5 grossly Current status: not met - unable to reassess Goal: Pt to report < 5/10 pain at worst in lower back and hips to be able to help spouse with management of home. Status at last note/certification: 6/07 pain at worst 
Current status: not met - unable to reassess Goal: Pt to report FOTO score of 51 pts to show improved function, quality of life. Status at last note/certification: FOTO 39 pts Current status: not met - unable to reassess Assessment/ Summary of Care: Pt attended initial evaluation and two Rx sessions, then called and requested to be D/C with no reason given. Goals were unable to be reassessed. Thus, Pt will be D/C at this time. Thank you for this referral. 
 
RECOMMENDATIONS: 
[x]Discontinue therapy: []Patient has reached or is progressing toward set goals [x]Patient is non-compliant or has abdicated 
    []Due to lack of appreciable progress towards set goals Meme Rivero, PT 11/14/2018 1:47 PM

## 2020-10-15 ENCOUNTER — TRANSCRIBE ORDER (OUTPATIENT)
Dept: SCHEDULING | Age: 85
End: 2020-10-15

## 2020-10-15 DIAGNOSIS — K21.9 GERD (GASTROESOPHAGEAL REFLUX DISEASE): ICD-10-CM

## 2020-10-15 DIAGNOSIS — R13.10 DYSPHAGIA: ICD-10-CM

## 2020-10-15 DIAGNOSIS — R10.13 ABDOMINAL PAIN, EPIGASTRIC: Primary | ICD-10-CM

## 2020-10-15 DIAGNOSIS — R19.7 DIARRHEA: ICD-10-CM

## 2020-10-27 ENCOUNTER — HOSPITAL ENCOUNTER (OUTPATIENT)
Dept: CT IMAGING | Age: 85
Discharge: HOME | End: 2020-10-27
Attending: INTERNAL MEDICINE
Payer: MEDICARE

## 2020-10-27 ENCOUNTER — HOSPITAL ENCOUNTER (OUTPATIENT)
Dept: GENERAL RADIOLOGY | Age: 85
Discharge: HOME | End: 2020-10-27
Attending: INTERNAL MEDICINE
Payer: MEDICARE

## 2020-10-27 DIAGNOSIS — R13.10 DYSPHAGIA: ICD-10-CM

## 2020-10-27 DIAGNOSIS — R19.7 DIARRHEA: ICD-10-CM

## 2020-10-27 DIAGNOSIS — K21.9 GERD (GASTROESOPHAGEAL REFLUX DISEASE): ICD-10-CM

## 2020-10-27 DIAGNOSIS — R10.13 ABDOMINAL PAIN, EPIGASTRIC: ICD-10-CM

## 2020-10-27 PROCEDURE — 74011000250 HC RX REV CODE- 250: Performed by: INTERNAL MEDICINE

## 2020-10-27 PROCEDURE — 74220 X-RAY XM ESOPHAGUS 1CNTRST: CPT

## 2020-10-27 PROCEDURE — 74011000255 HC RX REV CODE- 255: Performed by: INTERNAL MEDICINE

## 2020-10-27 RX ADMIN — ANTACID/ANTIFLATULENT 4 G: 380; 550; 10; 10 GRANULE, EFFERVESCENT ORAL at 08:53

## 2020-10-27 RX ADMIN — BARIUM SULFATE 20 ML: 980 POWDER, FOR SUSPENSION ORAL at 08:52

## 2020-11-03 ENCOUNTER — HOSPITAL ENCOUNTER (OUTPATIENT)
Dept: CT IMAGING | Age: 85
Discharge: HOME | End: 2020-11-03
Attending: INTERNAL MEDICINE
Payer: MEDICARE

## 2020-11-03 PROCEDURE — 74177 CT ABD & PELVIS W/CONTRAST: CPT

## 2020-11-03 PROCEDURE — 74011000636 HC RX REV CODE- 636: Performed by: INTERNAL MEDICINE

## 2020-11-03 PROCEDURE — 82565 ASSAY OF CREATININE: CPT

## 2020-11-03 RX ADMIN — IOPAMIDOL 95 ML: 612 INJECTION, SOLUTION INTRAVENOUS at 13:26

## 2020-11-04 LAB — CREAT UR-MCNC: 0.8 MG/DL (ref 0.6–1.3)
